# Patient Record
Sex: MALE | Race: WHITE | NOT HISPANIC OR LATINO | URBAN - METROPOLITAN AREA
[De-identification: names, ages, dates, MRNs, and addresses within clinical notes are randomized per-mention and may not be internally consistent; named-entity substitution may affect disease eponyms.]

---

## 2024-07-10 ENCOUNTER — TELEPHONE (OUTPATIENT)
Dept: UROLOGY | Facility: CLINIC | Age: 69
End: 2024-07-10
Payer: COMMERCIAL

## 2024-07-10 RX ORDER — TAMSULOSIN HYDROCHLORIDE 0.4 MG/1
0.4 CAPSULE ORAL 2 TIMES DAILY
Qty: 180 CAPSULE | Refills: 0 | Status: SHIPPED | OUTPATIENT
Start: 2024-07-10 | End: 2025-07-10

## 2024-07-10 NOTE — TELEPHONE ENCOUNTER
Patient requesting medication refill Tamsulosin be sent to local pharmacy up into follow up appointment with Dr. Gray scheduled for 08/05/2024 for 1:45PM at [facility]. Will notify Dr. Gray of patient concern.

## 2024-07-10 NOTE — TELEPHONE ENCOUNTER
----- Message from Marialuisa Callaway sent at 7/9/2024 12:06 PM CDT -----  Contact: self  Patient is requesting a refill for his medication that was filled by provider when he was at [facility]. Patient states the medication is tamoulosin      [place] [facility] [place] - [address] [facility]  [address] [facility] [address]  Phone: [phone] Fax: [phone]

## 2024-07-17 ENCOUNTER — TELEPHONE (OUTPATIENT)
Dept: UROLOGY | Facility: CLINIC | Age: 69
End: 2024-07-17
Payer: COMMERCIAL

## 2024-09-06 ENCOUNTER — LAB VISIT (OUTPATIENT)
Dept: LAB | Facility: HOSPITAL | Age: 69
End: 2024-09-06
Attending: UROLOGY
Payer: COMMERCIAL

## 2024-09-06 ENCOUNTER — OFFICE VISIT (OUTPATIENT)
Facility: CLINIC | Age: 69
End: 2024-09-06
Payer: COMMERCIAL

## 2024-09-06 VITALS — DIASTOLIC BLOOD PRESSURE: 86 MMHG | SYSTOLIC BLOOD PRESSURE: 142 MMHG | HEART RATE: 65 BPM | RESPIRATION RATE: 16 BRPM

## 2024-09-06 DIAGNOSIS — R31.29 OTHER MICROSCOPIC HEMATURIA: ICD-10-CM

## 2024-09-06 DIAGNOSIS — R97.20 ELEVATED PSA: ICD-10-CM

## 2024-09-06 DIAGNOSIS — R35.1 NOCTURIA: ICD-10-CM

## 2024-09-06 DIAGNOSIS — N40.1 BPH WITH OBSTRUCTION/LOWER URINARY TRACT SYMPTOMS: ICD-10-CM

## 2024-09-06 DIAGNOSIS — N13.8 BPH WITH OBSTRUCTION/LOWER URINARY TRACT SYMPTOMS: ICD-10-CM

## 2024-09-06 DIAGNOSIS — R97.20 ELEVATED PSA: Primary | ICD-10-CM

## 2024-09-06 DIAGNOSIS — Z80.42 FAMILY HISTORY OF PROSTATE CANCER: ICD-10-CM

## 2024-09-06 LAB
CREAT SERPL-MCNC: 0.9 MG/DL (ref 0.5–1.4)
EST. GFR  (NO RACE VARIABLE): >60 ML/MIN/1.73 M^2

## 2024-09-06 PROCEDURE — 82565 ASSAY OF CREATININE: CPT | Performed by: UROLOGY

## 2024-09-06 PROCEDURE — 84154 ASSAY OF PSA FREE: CPT | Performed by: UROLOGY

## 2024-09-06 PROCEDURE — 99999 PR PBB SHADOW E&M-EST. PATIENT-LVL III: CPT | Mod: PBBFAC,,, | Performed by: UROLOGY

## 2024-09-06 PROCEDURE — 36415 COLL VENOUS BLD VENIPUNCTURE: CPT | Mod: PO | Performed by: UROLOGY

## 2024-09-06 PROCEDURE — 84153 ASSAY OF PSA TOTAL: CPT | Performed by: UROLOGY

## 2024-09-06 RX ORDER — LOSARTAN POTASSIUM 100 MG/1
100 TABLET ORAL
COMMUNITY

## 2024-09-06 RX ORDER — FINASTERIDE 5 MG/1
5 TABLET, FILM COATED ORAL DAILY
Qty: 90 TABLET | Refills: 1 | Status: SHIPPED | OUTPATIENT
Start: 2024-09-06 | End: 2025-09-06

## 2024-09-06 RX ORDER — OXYCODONE AND ACETAMINOPHEN 10; 325 MG/1; MG/1
1 TABLET ORAL EVERY 4 HOURS PRN
COMMUNITY
Start: 2024-03-25

## 2024-09-06 RX ORDER — TRAZODONE HYDROCHLORIDE 50 MG/1
50 TABLET ORAL NIGHTLY PRN
COMMUNITY

## 2024-09-06 RX ORDER — HYDROCODONE BITARTRATE AND ACETAMINOPHEN 7.5; 325 MG/1; MG/1
1 TABLET ORAL EVERY 6 HOURS PRN
COMMUNITY
Start: 2023-12-29

## 2024-09-06 RX ORDER — FINASTERIDE 5 MG/1
5 TABLET, FILM COATED ORAL
COMMUNITY

## 2024-09-06 RX ORDER — NAPROXEN SODIUM 220 MG
220 TABLET ORAL 2 TIMES DAILY PRN
COMMUNITY

## 2024-09-06 NOTE — PROGRESS NOTES
Subjective:       Patient ID: Dario Cisneros is a 68 y.o. male.    Chief Complaint: Follow-up      History of Present Illness:     Mr Cisneros had an elevated PSA of 4.740 on 12-22-23.  He has a family history of prostate cancer in his grandfather.  He has BPH with LUTS.  He is taking finasteride 5 mg daily and he says he needs it refilled.  He is also taking tamsulosin 0.4 mg 1 PO BID and he does not need it refilled at this time.  Moderate to slow urinary stream.  Some feelings of incomplete bladder emtpying.  PVR today on 9-6-24 is 71 ml.  Nocturia X 1-2.  No urgency.  No gross hematuria.      Follow-up  Pertinent negatives include no chest pain, chills, fever, nausea, rash or vomiting.        History reviewed. No pertinent past medical history.  No family history on file.  Social History     Socioeconomic History    Marital status:    Tobacco Use    Smoking status: Every Day     Types: Cigarettes, Cigars    Smokeless tobacco: Never     Outpatient Encounter Medications as of 9/6/2024   Medication Sig Dispense Refill    finasteride (PROSCAR) 5 mg tablet Take 5 mg by mouth.      HYDROcodone-acetaminophen (NORCO) 7.5-325 mg per tablet Take 1 tablet by mouth every 6 (six) hours as needed.      losartan (COZAAR) 100 MG tablet Take 100 mg by mouth.      naproxen sodium (ANAPROX) 220 MG tablet Take 220 mg by mouth 2 (two) times daily as needed.      oxyCODONE-acetaminophen (PERCOCET)  mg per tablet Take 1 tablet by mouth every 4 (four) hours as needed.      tamsulosin (FLOMAX) 0.4 mg Cap Take 1 capsule (0.4 mg total) by mouth 2 (two) times a day. 180 capsule 0    traZODone (DESYREL) 50 MG tablet Take 50 mg by mouth nightly as needed.      finasteride (PROSCAR) 5 mg tablet Take 1 tablet (5 mg total) by mouth once daily. 90 tablet 1     No facility-administered encounter medications on file as of 9/6/2024.        Review of Systems   Constitutional:  Negative for chills and fever.   Respiratory:  Negative for  shortness of breath.    Cardiovascular:  Negative for chest pain.   Gastrointestinal:  Negative for nausea and vomiting.   Musculoskeletal:  Negative for back pain.   Skin:  Negative for rash.   Neurological:  Negative for dizziness.   Psychiatric/Behavioral:  Negative for agitation.        Objective:     BP (!) 142/86   Pulse 65   Resp 16     Physical Exam  Constitutional:       Appearance: Normal appearance.   Pulmonary:      Effort: Pulmonary effort is normal.   Abdominal:      Palpations: Abdomen is soft.   Genitourinary:     Comments: Prostate exam was deferred  Neurological:      Mental Status: He is alert and oriented to person, place, and time.   Psychiatric:         Mood and Affect: Mood normal.         No visits with results within 1 Day(s) from this visit.   Latest known visit with results is:   No results found for any previous visit.        No results found for this or any previous visit (from the past 8760 hour(s)).     Assessment:       1. Elevated PSA    2. BPH with obstruction/lower urinary tract symptoms    3. Other microscopic hematuria    4. Nocturia    5. Family history of prostate cancer      Plan:     Orders Placed This Encounter    PSA, Total and Free    CREATININE, SERUM    finasteride (PROSCAR) 5 mg tablet          12-22-23  PSA 4.740    6-8-23  PSA 1.40    11-28-22  PSA 2.32    7-18-22  PSA 1.79    3-18-24  Cr 0.71.  .    2-16-24  HgbA1c 6.5    I reviewed the above lab results.         Assessment:  - Elevated PSA.  - BPH with LUTS.  - Nocturia.  - Small amount of microscopic hematuria today on 9-6-24.  He says he underwent a cystoscopy about 10 years ago by an outside Urologist in [place].  He smokes cigars daily for about 15 years.  He says he quit smoking cigarettes in 1976.  He is not taking a blood thinner.  - Family history of prostate cancer in his grandfather.  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  Dr Cannon is  his Oncologist.    Plan:  - The patient says he wants to hold off on doing a cystoscopy for his microscopic hematuria at this time.  - PSA and creatinine today.  - Refilled finasteride 5 mg 1 PO qdaily.  - Continue tamsulosin 0.4 mg 1 PO BID.  - I discussed dietary modifications with him today and I recommended he drink mostly water during the day.  - I recommended he limit his fluid intake at night to small amounts of water and to void just prior to bedtime.   - RTC in 5 months with a PVR on arrival.

## 2024-09-10 ENCOUNTER — TELEPHONE (OUTPATIENT)
Dept: UROLOGY | Facility: CLINIC | Age: 69
End: 2024-09-10
Payer: COMMERCIAL

## 2024-09-10 DIAGNOSIS — R97.20 ELEVATED PSA: Primary | ICD-10-CM

## 2024-09-10 LAB
PROSTATE SPECIFIC ANTIGEN, TOTAL: 1.5 NG/ML (ref 0–4)
PSA FREE MFR SERPL: 17.33 %
PSA FREE SERPL-MCNC: 0.26 NG/ML (ref 0–1.5)

## 2024-09-10 NOTE — TELEPHONE ENCOUNTER
Attempted to contact pt to inform him of his test results; no answer.  Detailed vm left informing him of the results and pending appts.

## 2024-09-24 ENCOUNTER — TELEPHONE (OUTPATIENT)
Dept: UROLOGY | Facility: CLINIC | Age: 69
End: 2024-09-24
Payer: COMMERCIAL

## 2024-10-01 RX ORDER — TAMSULOSIN HYDROCHLORIDE 0.4 MG/1
0.4 CAPSULE ORAL 2 TIMES DAILY
Qty: 180 CAPSULE | Refills: 1 | Status: SHIPPED | OUTPATIENT
Start: 2024-10-01 | End: 2025-10-01

## 2024-12-26 ENCOUNTER — TELEPHONE (OUTPATIENT)
Dept: UROLOGY | Facility: CLINIC | Age: 69
End: 2024-12-26
Payer: COMMERCIAL

## 2024-12-26 RX ORDER — TAMSULOSIN HYDROCHLORIDE 0.4 MG/1
0.4 CAPSULE ORAL 2 TIMES DAILY
Qty: 180 CAPSULE | Refills: 1 | Status: SHIPPED | OUTPATIENT
Start: 2024-12-26 | End: 2025-12-26

## 2024-12-26 NOTE — TELEPHONE ENCOUNTER
Called patient and let him know that  refilled his medication and his lab is on the 4th and his appointment is on the 7th. Patient expressed understanding.      ----- Message from Derek Gray MD sent at 12/26/2024  3:10 PM CST -----  Contact: pt  Please call Mr Cisneros and let him know that I refilled his tamsulosin 0.4 mg 1 to take by mouth twice daily.  Make sure he knows the details of his PSA lab appointment and office appointment.  ----- Message -----  From: Denise Odell CMA  Sent: 12/26/2024   2:05 PM CST  To: Derek Gray MD    Please advise patient's request for medication.  ----- Message -----  From: Priya Hawkins  Sent: 12/26/2024   2:03 PM CST  To: [facility] Staff    Type:  RX Refill Request    Who Called:  pt  Refill or New Rx: refill  RX Name and Strength: tamsulosin (FLOMAX) 0.4 mg Cap  How is the patient currently taking it? (ex. 1XDay):  Is this a 30 day or 90 day RX:  Preferred Pharmacy with phone number: [phone]  Local or Mail Order: local  Ordering Provider: walker  Would the patient rather a call back or a response via [facility]?   Best Call Back Number:  Additional Information:       [facility] - [place] - [address] [facility]  [address] [facility]  [place] [address]  Phone: [phone] Fax: [phone]

## 2025-01-27 ENCOUNTER — LAB VISIT (OUTPATIENT)
Dept: LAB | Facility: HOSPITAL | Age: 70
End: 2025-01-27
Attending: UROLOGY
Payer: MEDICARE

## 2025-01-27 ENCOUNTER — OFFICE VISIT (OUTPATIENT)
Facility: CLINIC | Age: 70
End: 2025-01-27
Payer: MEDICARE

## 2025-01-27 VITALS
SYSTOLIC BLOOD PRESSURE: 149 MMHG | DIASTOLIC BLOOD PRESSURE: 84 MMHG | HEART RATE: 67 BPM | WEIGHT: 200.63 LBS | RESPIRATION RATE: 16 BRPM

## 2025-01-27 DIAGNOSIS — N40.0 BPH WITHOUT URINARY OBSTRUCTION: ICD-10-CM

## 2025-01-27 DIAGNOSIS — R97.20 ELEVATED PSA: ICD-10-CM

## 2025-01-27 DIAGNOSIS — N28.1 COMPLEX RENAL CYST: ICD-10-CM

## 2025-01-27 DIAGNOSIS — Z80.42 FAMILY HISTORY OF PROSTATE CANCER: ICD-10-CM

## 2025-01-27 DIAGNOSIS — R31.29 OTHER MICROSCOPIC HEMATURIA: Primary | ICD-10-CM

## 2025-01-27 LAB
BILIRUB UR QL STRIP: NEGATIVE
COMPLEXED PSA SERPL-MCNC: 2 NG/ML (ref 0–4)
GLUCOSE UR QL STRIP: POSITIVE
KETONES UR QL STRIP: POSITIVE
LEUKOCYTE ESTERASE UR QL STRIP: NEGATIVE
PH, POC UA: 5.5
POC BLOOD, URINE: POSITIVE
POC NITRATES, URINE: NEGATIVE
PROT UR QL STRIP: NEGATIVE
SP GR UR STRIP: 1.02 (ref 1–1.03)
UROBILINOGEN UR STRIP-ACNC: 0.2 (ref 0.3–2.2)

## 2025-01-27 PROCEDURE — 99999PBSHW POCT URINALYSIS, DIPSTICK, AUTOMATED, W/O SCOPE: Mod: PBBFAC,,,

## 2025-01-27 PROCEDURE — 99214 OFFICE O/P EST MOD 30 MIN: CPT | Mod: S$PBB,,, | Performed by: UROLOGY

## 2025-01-27 PROCEDURE — 81003 URINALYSIS AUTO W/O SCOPE: CPT | Mod: PBBFAC,PO | Performed by: UROLOGY

## 2025-01-27 PROCEDURE — 99213 OFFICE O/P EST LOW 20 MIN: CPT | Mod: PBBFAC,PO | Performed by: UROLOGY

## 2025-01-27 PROCEDURE — 36415 COLL VENOUS BLD VENIPUNCTURE: CPT | Mod: PO | Performed by: UROLOGY

## 2025-01-27 PROCEDURE — 99999 PR PBB SHADOW E&M-EST. PATIENT-LVL III: CPT | Mod: PBBFAC,,, | Performed by: UROLOGY

## 2025-01-27 PROCEDURE — 84153 ASSAY OF PSA TOTAL: CPT | Performed by: UROLOGY

## 2025-01-27 NOTE — PROGRESS NOTES
Subjective:       Patient ID: Dario Cisneros is a 69 y.o. male.    Chief Complaint: Follow-up      History of Present Illness:     Mr Cisneros has persistent microscopic hematuria about 20 years per the patient.  He has a small amount of microscopic hematuria on 9-6-24 and today on 1-27-25.  He says he underwent a cystoscopy over 10 years ago by an outside Urologist in [place].  He smokes cigars daily for about 15 years.  He says he quit smoking cigarettes in 1976.  He is not taking a blood thinner.  He underwent a MRI abdomen with and without IV contrast on 9-30-24 shows there is stable small cyst in the left kidney. There is stable 3.4 cm rim-enhancing hemorrhagic cyst arising from the right kidney.  He had an elevated PSA that has normalized with observation.  He has a family history of prostate cancer in his grandfather.  He has a history of neuroendocrine abdominal tumors resection by Dr Adiel Fitch in July 2023.  Dr Cannon is his Oncologist.         History reviewed. No pertinent past medical history.  No family history on file.  Social History     Socioeconomic History    Marital status:    Tobacco Use    Smoking status: Every Day     Types: Cigarettes, Cigars    Smokeless tobacco: Never     Social Drivers of Health     Financial Resource Strain: High Risk (1/26/2025)    Overall Financial Resource Strain (CARDIA)     Difficulty of Paying Living Expenses: Hard   Food Insecurity: Food Insecurity Present (1/26/2025)    Hunger Vital Sign     Worried About Running Out of Food in the Last Year: Sometimes true     Ran Out of Food in the Last Year: Never true   Physical Activity: Sufficiently Active (1/26/2025)    Exercise Vital Sign     Days of Exercise per Week: 7 days     Minutes of Exercise per Session: 150+ min   Stress: Patient Declined (1/26/2025)    [employer] - Occupational Stress Questionnaire     Feeling of Stress : Patient declined   Housing Stability:  Unknown (1/26/2025)    Housing Stability Vital Sign     Unable to Pay for Housing in the Last Year: No     Outpatient Encounter Medications as of 1/27/2025   Medication Sig Dispense Refill    finasteride (PROSCAR) 5 mg tablet Take 5 mg by mouth.      finasteride (PROSCAR) 5 mg tablet Take 1 tablet (5 mg total) by mouth once daily. 90 tablet 1    HYDROcodone-acetaminophen (NORCO) 7.5-325 mg per tablet Take 1 tablet by mouth every 6 (six) hours as needed.      losartan (COZAAR) 100 MG tablet Take 100 mg by mouth.      oxyCODONE-acetaminophen (PERCOCET)  mg per tablet Take 1 tablet by mouth every 4 (four) hours as needed.      tamsulosin (FLOMAX) 0.4 mg Cap Take 1 capsule (0.4 mg total) by mouth 2 (two) times a day. 180 capsule 1    tamsulosin (FLOMAX) 0.4 mg Cap Take 1 capsule (0.4 mg total) by mouth 2 (two) times a day. 180 capsule 1    traZODone (DESYREL) 50 MG tablet Take 50 mg by mouth nightly as needed.      naproxen sodium (ANAPROX) 220 MG tablet Take 220 mg by mouth 2 (two) times daily as needed.       No facility-administered encounter medications on file as of 1/27/2025.        Review of Systems   Constitutional:  Negative for chills and fever.   Respiratory:  Negative for shortness of breath.    Cardiovascular:  Negative for chest pain.   Gastrointestinal:  Negative for nausea and vomiting.   Genitourinary:  Negative for difficulty urinating.   Musculoskeletal:  Negative for back pain.   Skin:  Negative for rash.   Neurological:  Negative for dizziness.   Psychiatric/Behavioral:  Negative for agitation.        Objective:     BP (!) 149/84 (Patient Position: Sitting)   Pulse 67   Resp 16   Wt 91 kg (200 lb 9.9 oz)     Physical Exam  Constitutional:       Appearance: Normal appearance.   Pulmonary:      Effort: Pulmonary effort is normal.   Abdominal:      Palpations: Abdomen is soft.   Neurological:      Mental Status: He is alert and oriented to person, place, and time.   Psychiatric:         Mood  and Affect: Mood normal.         Office Visit on 01/27/2025   Component Date Value Ref Range Status    POC Blood, Urine 01/27/2025 Positive (A)  Negative Final    POC Bilirubin, Urine 01/27/2025 Negative  Negative Final    POC Urobilinogen, Urine 01/27/2025 0.2 (A)  0.3 - 2.2 Final    POC Ketones, Urine 01/27/2025 Positive (A)  Negative Final    POC Protein, Urine 01/27/2025 Negative  Negative Final    POC Nitrates, Urine 01/27/2025 Negative  Negative Final    POC Glucose, Urine 01/27/2025 Positive (A)  Negative Final    pH, UA 01/27/2025 5.5   Final    POC Specific Gravity, Urine 01/27/2025 1.020  1.003 - 1.029 Final    POC Leukocytes, Urine 01/27/2025 Negative  Negative Final        No results found for this or any previous visit (from the past 8760 hours).     Assessment:       1. Other microscopic hematuria    2. Complex renal cyst    3. Elevated PSA    4. BPH without urinary obstruction    5. Family history of prostate cancer        Plan:     Orders Placed This Encounter    Urinalysis Microscopic    Urinalysis    POCT Urinalysis, Dipstick, Automated, W/O Scope        9-30-24  MRI abdomen with and without IV contrast.  [facility].  See report.  There is stable small cyst in the left kidney. There is stable 3.4 cm rim-enhancing hemorrhagic cyst arising from the right kidney.  OVERALL INTERVAL DECREASE IN SIZE OF CENTRALLY NECROTIC RIM ENHANCING OBSERVATIONS IN THE RIGHT HEPATIC LOBE, CONSISTENT WITH POSTTREATMENT CHANGE.  STABLE ADDITIONAL BILOBAR HEPATIC METASTASES.  NO EVIDENCE FOR NEW DISEASE.     9-30-24  PET/CT.  [facility].  See report.  Gross stability of hepatic metastatic disease.  The prostate is enlarged.     I reviewed the above imaging results.          9-6-24  PSA 1.5    12-22-23  PSA 4.740     6-8-23  PSA 1.40     11-28-22  PSA 2.32     7-18-22  PSA 1.79    1-27-25  Cr 1.08.  GFR 75.     2-16-24  HgbA1c 6.5     I reviewed the above lab results.            Assessment:  - Persistent microscopic hematuria  for over 20 years per the patient.  Small amount of microscopic hematuria on 9-6-24 and today on 1-27-25.  He says he underwent a cystoscopy over10 years ago by an outside Urologist in [place].  He smokes cigars daily for about 15 years.  He says he quit smoking cigarettes in 1976.  He is not taking a blood thinner.  - MRI abdomen with and without IV contrast on 9-30-24 shows there is stable small cyst in the left kidney. There is stable 3.4 cm rim-enhancing hemorrhagic cyst arising from the right kidney.   - Elevated PSA that has normalized with observation.  - BPH without significant LUTS.  - Family history of prostate cancer in his grandfather.  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  Dr Cannon is his Oncologist.     Plan:  - The patient says he wants to hold off on doing a cystoscopy for his persistent microscopic hematuria at this time.  He says that if he decides to undergo a cystoscopy that he wants to do it in the operating room.  The mutual decision was made for him to do a repeat urinalysis in 3 weeks which I ordered.  He says he will be out of town until 2-12-25.  - PSA from today on 1-27-25 is pending.  - Continue finasteride 5 mg 1 PO qdaily.  - Continue tamsulosin 0.4 mg 1 PO BID.  - I discussed dietary modifications with him today and I recommended he drink mostly water during the day.  - RTC in 6 months or sooner as needed.

## 2025-01-28 ENCOUNTER — TELEPHONE (OUTPATIENT)
Facility: CLINIC | Age: 70
End: 2025-01-28
Payer: COMMERCIAL

## 2025-01-28 DIAGNOSIS — R97.20 PSA ELEVATION: Primary | ICD-10-CM

## 2025-01-28 NOTE — TELEPHONE ENCOUNTER
.Outgoing call placed to patient, patient verified name and date of birth, patient notified of below per Dr. Gray, he verbalized understanding and appointments scheduled per patient request.      ----- Message from Derek Gray MD sent at 1/28/2025  9:01 AM CST -----  Please call Mr Cisneros and let him know that I reviewed his PSA and his PSA has increased from 1.5 in September 2024 to 2.0.  I recommend rechecking his PSA in 6 months.  Schedule him a 6 month appointment and schedule him a lab visit in 6 months several days prior to his appointment.  Also, schedule his lab visit for Monday 2-17-25 to do his repeat urinalysis through the lab due to his persistent microscopic blood in his urine.  Ask him if he has any questions.

## 2025-02-13 ENCOUNTER — LAB VISIT (OUTPATIENT)
Dept: LAB | Facility: HOSPITAL | Age: 70
End: 2025-02-13
Attending: UROLOGY
Payer: MEDICARE

## 2025-02-13 DIAGNOSIS — R31.29 OTHER MICROSCOPIC HEMATURIA: ICD-10-CM

## 2025-02-13 LAB
BACTERIA #/AREA URNS AUTO: ABNORMAL /HPF
BILIRUB UR QL STRIP: NEGATIVE
CAOX CRY UR QL COMP ASSIST: ABNORMAL
CLARITY UR REFRACT.AUTO: ABNORMAL
COLOR UR AUTO: YELLOW
GLUCOSE UR QL STRIP: NEGATIVE
HGB UR QL STRIP: NEGATIVE
KETONES UR QL STRIP: NEGATIVE
LEUKOCYTE ESTERASE UR QL STRIP: NEGATIVE
MICROSCOPIC COMMENT: ABNORMAL
NITRITE UR QL STRIP: NEGATIVE
PH UR STRIP: 6 [PH] (ref 5–8)
PROT UR QL STRIP: NEGATIVE
RBC #/AREA URNS AUTO: 5 /HPF (ref 0–4)
SP GR UR STRIP: 1.03 (ref 1–1.03)
URN SPEC COLLECT METH UR: ABNORMAL
WBC #/AREA URNS AUTO: 1 /HPF (ref 0–5)

## 2025-02-13 PROCEDURE — 81001 URINALYSIS AUTO W/SCOPE: CPT | Performed by: UROLOGY

## 2025-02-17 ENCOUNTER — RESULTS FOLLOW-UP (OUTPATIENT)
Facility: CLINIC | Age: 70
End: 2025-02-17
Payer: MEDICARE

## 2025-02-17 NOTE — TELEPHONE ENCOUNTER
Called patient and let him know that we will continue to monitor and to keep his July appointment as is.    ----- Message from Derek Gray MD sent at 2/17/2025  8:54 AM CST -----  Please call Mr Cisneros and let him know that I reviewed his urinalysis and it only showed 5 RBCs per high powered field.  I am okay with continuing to monitor his urine at this time.  Make sure he   knows the details of his follow up labs and office visit on July 2025.  Ask him if he has any questions.  ----- Message -----  From: Wilfrid, [facility]  Sent: 2/13/2025  11:02 PM CST  To: Derek Gray MD

## 2025-07-21 ENCOUNTER — TELEPHONE (OUTPATIENT)
Dept: UROLOGY | Facility: CLINIC | Age: 70
End: 2025-07-21
Payer: MEDICARE

## 2025-07-21 NOTE — TELEPHONE ENCOUNTER
.Outgoing call placed to patient, patient verified identifiers, patient wanted something sooner that works with his work schedule, notified of availability and appts rescheduled.    Copied from CRM #[id number]. Topic: Appointments - Appointment Access  >> Jul 21, 2025  1:30 PM Antonina wrote:  Type:  Sooner Apoointment Request    Caller is requesting a sooner appointment.  Caller declined first available appointment listed below.  Caller will not accept being placed on the waitlist and is requesting a message be sent to doctor.  Name of Caller:Mrs Cisneros   When is the first available appointment?  Symptoms:PSA and follow up  Would the patient rather a call back or a response via [facility]? Call back  Best Call Back Number:[phone]  Additional Information: side effects was calling to see if he can be seen on after July 31. He will be out of town on his original appointment date. Due to his job is out the last 2 weeks of every month. She understands if that's not possible though.

## 2025-08-04 ENCOUNTER — LAB VISIT (OUTPATIENT)
Dept: LAB | Facility: HOSPITAL | Age: 70
End: 2025-08-04
Attending: UROLOGY
Payer: MEDICARE

## 2025-08-04 DIAGNOSIS — R97.20 PSA ELEVATION: ICD-10-CM

## 2025-08-04 LAB
PSA FREE MFR SERPL: 21.11 %
PSA FREE SERPL-MCNC: 0.38 NG/ML
PSA SERPL-MCNC: 1.8 NG/ML

## 2025-08-04 PROCEDURE — 36415 COLL VENOUS BLD VENIPUNCTURE: CPT | Mod: PO

## 2025-08-04 PROCEDURE — 84154 ASSAY OF PSA FREE: CPT

## 2025-08-08 ENCOUNTER — OFFICE VISIT (OUTPATIENT)
Facility: CLINIC | Age: 70
End: 2025-08-08
Payer: MEDICARE

## 2025-08-08 VITALS
HEART RATE: 71 BPM | SYSTOLIC BLOOD PRESSURE: 139 MMHG | RESPIRATION RATE: 14 BRPM | WEIGHT: 201.63 LBS | BODY MASS INDEX: 28.87 KG/M2 | HEIGHT: 70 IN | DIASTOLIC BLOOD PRESSURE: 90 MMHG

## 2025-08-08 DIAGNOSIS — R35.1 NOCTURIA: ICD-10-CM

## 2025-08-08 DIAGNOSIS — R97.20 PSA ELEVATION: ICD-10-CM

## 2025-08-08 DIAGNOSIS — N40.0 BPH WITHOUT URINARY OBSTRUCTION: ICD-10-CM

## 2025-08-08 DIAGNOSIS — Z80.42 FAMILY HISTORY OF PROSTATE CANCER: ICD-10-CM

## 2025-08-08 DIAGNOSIS — R31.29 PERSISTENT MICROSCOPIC HEMATURIA: Primary | ICD-10-CM

## 2025-08-08 DIAGNOSIS — N28.1 COMPLEX RENAL CYST: ICD-10-CM

## 2025-08-08 PROCEDURE — 99999 PR PBB SHADOW E&M-EST. PATIENT-LVL III: CPT | Mod: PBBFAC,,, | Performed by: UROLOGY

## 2025-08-08 PROCEDURE — 99213 OFFICE O/P EST LOW 20 MIN: CPT | Mod: PBBFAC,PO | Performed by: UROLOGY

## 2025-08-08 PROCEDURE — 99214 OFFICE O/P EST MOD 30 MIN: CPT | Mod: S$PBB,,, | Performed by: UROLOGY

## 2025-08-08 RX ORDER — FINASTERIDE 5 MG/1
5 TABLET, FILM COATED ORAL NIGHTLY
Qty: 90 TABLET | Refills: 1 | Status: SHIPPED | OUTPATIENT
Start: 2025-08-08 | End: 2026-08-08

## 2025-08-08 RX ORDER — TAMSULOSIN HYDROCHLORIDE 0.4 MG/1
0.4 CAPSULE ORAL NIGHTLY
Qty: 90 CAPSULE | Refills: 1 | Status: SHIPPED | OUTPATIENT
Start: 2025-08-08 | End: 2026-08-08

## 2025-08-08 NOTE — PROGRESS NOTES
Subjective:       Patient ID: Dario Cisneros is a 69 y.o. male.    Chief Complaint: Other Misc (Followup)      History of Present Illness:     Mr Cisneros has a long history of persistent microscopic hematuria for over 20 years per the patient.  He has a small amount of microscopic hematuria on 9-6-24, on 1-27-25, and today on 8-8-25.  No gross hematuria.  He said he underwent a cystoscopy over 10 years ago by an outside Urologist in [place].  He smokes cigars daily for about 15 years.  He said he quit smoking cigarettes in 1976.  He is not taking a blood thinner.    He underwent a MRI abdomen with and without IV contrast on 3-19-25 that showed relatively unchanged nonenhancing slightly complex right upper pole posterior cortex lesion. No developing new lesion. No hydronephrosis or significant inflammation. Left upper pole renal cyst noted.  Previous partial left hepatectomy. Persistent residual widespread hepatic metastases demonstrate and overall trend of slightly increased size and conspicuity over the interim.    He says that he is still taking tamsulosin 0.4 mg 1 PO BID and finasteride 5 mg daily.  Nocturia X 1-2.  Normal urinary flow.  He feels that he is emptying his bladder.  PVR today on 8-8-25 is 13 ml.  He had an elevated PSA that has mildly decreased with finasteride and with observation.  He has a family history of prostate cancer in his grandfather.    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  Dr Cannon is his Oncologist.  He underwent a PET/CT on 3-19-25 that showed interval trend of increased SSR positivity and in some cases conspicuity of SSR-positive hepatic metastases suggestive of progression.          No past medical history on file.  No family history on file.  Social History[1]  Encounter Medications[2]     Review of Systems   Constitutional:  Negative for chills and fever.   Respiratory:  Negative for shortness of breath.   "  Cardiovascular:  Negative for chest pain.   Gastrointestinal:  Negative for nausea and vomiting.   Genitourinary:  Negative for difficulty urinating and dysuria.   Musculoskeletal:  Negative for back pain.   Skin:  Negative for rash.   Neurological:  Negative for dizziness.   Psychiatric/Behavioral:  Negative for agitation.        Objective:     BP (!) 139/90 (BP Location: Right arm, Patient Position: Sitting)   Pulse 71   Resp 14   Ht 5' 10" (1.778 m)   Wt 91.4 kg (201 lb 9.8 oz)   BMI 28.93 kg/m²     Physical Exam  Constitutional:       Appearance: Normal appearance.   Pulmonary:      Effort: Pulmonary effort is normal.   Abdominal:      Palpations: Abdomen is soft.   Genitourinary:     Comments: Prostate exam deferred  Neurological:      Mental Status: He is alert and oriented to person, place, and time.   Psychiatric:         Mood and Affect: Mood normal.         No visits with results within 1 Day(s) from this visit.   Latest known visit with results is:   Lab Visit on 08/04/2025   Component Date Value Ref Range Status    Prostate Specific Antigen 08/04/2025 1.80  <=4.00 ng/mL Final    PSA Expected levels:  Hormonal therapy: < 0.05 ng/mL   Prostatectomy: < 0.01 ng/mL   Radiation therapy: < 1.00 ng/mL      Prostate Specific Antigen Free 08/04/2025 0.38  <=1.50 ng/mL Final    PSA % Free 08/04/2025 21.11  Not established % Final        No results found for this or any previous visit (from the past 8760 hours).     Assessment:       1. Persistent microscopic hematuria    2. Nocturia    3. Complex renal cyst    4. BPH without urinary obstruction    5. PSA elevation    6. Family history of prostate cancer        Plan:     Orders Placed This Encounter    PSA, Total and Free    tamsulosin (FLOMAX) 0.4 mg Cap    finasteride (PROSCAR) 5 mg tablet          3-19-25  MRI abdomen with and without IV contrast.  [facility].  See report.  Relatively unchanged nonenhancing slightly complex right upper pole posterior " cortex lesion. No developing new lesion. No hydronephrosis or significant inflammation. Left upper pole renal cyst noted.  Previous partial left hepatectomy. Persistent residual widespread hepatic metastases demonstrate and overall trend of slightly increased size and conspicuity over the interim.     9-30-24  MRI abdomen with and without IV contrast.  [facility].  See report.  There is stable small cyst in the left kidney. There is stable 3.4 cm rim-enhancing hemorrhagic cyst arising from the right kidney.    3-19-25  PET/CT.  [facility].  See report.  Interval trend of increased SSR positivity and in some cases conspicuity of SSR-positive hepatic metastases suggestive of progression.      9-30-24  PET/CT.  [facility].  See report.  Gross stability of hepatic metastatic disease.  The prostate is enlarged.      I reviewed the above imaging results.           8-4-25  PSA 1.80.  Free % PSA 21.11.    4-14-25  PSA 1.84    1-27-25  PSA 2.0     9-6-24  PSA 1.5     12-22-23  PSA 4.740     6-8-23  PSA 1.40     11-28-22  PSA 2.32     7-18-22  PSA 1.79    8-6-25 Cr 0.87.  GFR 94.     1-27-25  Cr 1.08.  GFR 75.     2-16-24  HgbA1c 6.5     I reviewed the above lab results.            Assessment:  - Persistent microscopic hematuria for over 20 years per the patient.  Small amount of microscopic hematuria on 9-6-24, on 1-27-25, and today on 8-8-25.  He said he underwent a cystoscopy over 10 years ago by an outside Urologist in [place].  He smokes cigars daily for about 15 years.  He said he quit smoking cigarettes in 1976.  He is not taking a blood thinner.  - MRI abdomen with and without IV contrast on 3-19-25 showed relatively unchanged nonenhancing slightly complex right upper pole posterior cortex lesion. No developing new lesion. No hydronephrosis or significant inflammation. Left upper pole renal cyst noted.  Previous partial left hepatectomy. Persistent residual widespread hepatic metastases demonstrate and overall trend of  slightly increased size and conspicuity over the interim.   - Nocturia.    - BPH without significant LUTS on tamsulosin and finasteride.  PVR today on 8-8-25 is 13 ml.  - Elevated PSA that has mildly decreased with finasteride and with observation.    - Family history of prostate cancer in his grandfather.  - Neuroendocrine abdominal tumors that were resected by Dr Adiel Fitch in July 2023.  Dr Cannon is his Oncologist.  PET/CT on 3-19-25 showed interval trend of increased SSR positivity and in some cases conspicuity of SSR-positive hepatic metastases suggestive of progression.      Plan:  - I again discussed with the patient today his persistent microscopic hematuria and I recommended a cystoscopy to evaluate his bladder for possible bladder cancer.  The patient again says that he does not want to undergo a cystoscopy at this time.  I told him to notify the office if he changes his mind.    - Continued observation of his renal cysts.  - Continue with observation of his PSA with a repeat PSA in 6 months.  - Refilled finasteride 5 mg 1 PO qhs.  - Changed tamsulosin 0.4 mg 1 PO BID to 0.4 mg 1 PO qhs today on 8-8-25.  - I discussed dietary modifications with him today and I recommended he drink mostly water during the day.  - I recommended he limit his fluid intake at night to small amounts of water and to void just prior to bedtime.   - PSA in 6 months a few days prior to a 6 month appointment.  - RTC in 6 months or sooner as needed.                          [1]   Social History  Socioeconomic History    Marital status:    Tobacco Use    Smoking status: Every Day     Types: Cigarettes, Cigars    Smokeless tobacco: Never     Social Drivers of Health     Financial Resource Strain: High Risk (1/26/2025)    Overall Financial Resource Strain (CARDIA)     Difficulty of Paying Living Expenses: Hard   Food Insecurity: Food Insecurity Present (1/26/2025)    Hunger Vital Sign     Worried About Running Out  of Food in the Last Year: Sometimes true     Ran Out of Food in the Last Year: Never true   Physical Activity: Sufficiently Active (1/26/2025)    Exercise Vital Sign     Days of Exercise per Week: 7 days     Minutes of Exercise per Session: 150+ min   Stress: Patient Declined (1/26/2025)    [employer] - Occupational Stress Questionnaire     Feeling of Stress : Patient declined   Housing Stability: Unknown (1/26/2025)    Housing Stability Vital Sign     Unable to Pay for Housing in the Last Year: No   [2]   Outpatient Encounter Medications as of 8/8/2025   Medication Sig Dispense Refill    finasteride (PROSCAR) 5 mg tablet Take 5 mg by mouth.      finasteride (PROSCAR) 5 mg tablet Take 1 tablet (5 mg total) by mouth once daily. 90 tablet 1    HYDROcodone-acetaminophen (NORCO) 7.5-325 mg per tablet Take 1 tablet by mouth every 6 (six) hours as needed.      losartan (COZAAR) 100 MG tablet Take 100 mg by mouth.      oxyCODONE-acetaminophen (PERCOCET)  mg per tablet Take 1 tablet by mouth every 4 (four) hours as needed.      tamsulosin (FLOMAX) 0.4 mg Cap Take 1 capsule (0.4 mg total) by mouth 2 (two) times a day. 180 capsule 1    tamsulosin (FLOMAX) 0.4 mg Cap Take 1 capsule (0.4 mg total) by mouth 2 (two) times a day. 180 capsule 1    traZODone (DESYREL) 50 MG tablet Take 50 mg by mouth nightly as needed.      finasteride (PROSCAR) 5 mg tablet Take 1 tablet (5 mg total) by mouth nightly. 90 tablet 1    naproxen sodium (ANAPROX) 220 MG tablet Take 220 mg by mouth 2 (two) times daily as needed.      tamsulosin (FLOMAX) 0.4 mg Cap Take 1 capsule (0.4 mg total) by mouth nightly. 90 capsule 1     No facility-administered encounter medications on file as of 8/8/2025.